# Patient Record
Sex: MALE | Race: WHITE | NOT HISPANIC OR LATINO | Employment: FULL TIME | URBAN - METROPOLITAN AREA
[De-identification: names, ages, dates, MRNs, and addresses within clinical notes are randomized per-mention and may not be internally consistent; named-entity substitution may affect disease eponyms.]

---

## 2024-09-15 ENCOUNTER — OFFICE VISIT (OUTPATIENT)
Dept: URGENT CARE | Facility: URGENT CARE | Age: 34
End: 2024-09-15
Payer: COMMERCIAL

## 2024-09-15 VITALS
RESPIRATION RATE: 16 BRPM | TEMPERATURE: 99.1 F | WEIGHT: 190 LBS | HEART RATE: 90 BPM | OXYGEN SATURATION: 100 % | DIASTOLIC BLOOD PRESSURE: 90 MMHG | SYSTOLIC BLOOD PRESSURE: 123 MMHG

## 2024-09-15 DIAGNOSIS — R05.8 PRODUCTIVE COUGH: Primary | ICD-10-CM

## 2024-09-15 DIAGNOSIS — J18.9 PNEUMONIA OF LEFT LOWER LOBE DUE TO INFECTIOUS ORGANISM: ICD-10-CM

## 2024-09-15 LAB
FLUAV AG SPEC QL IA: NEGATIVE
FLUBV AG SPEC QL IA: NEGATIVE

## 2024-09-15 PROCEDURE — 87635 SARS-COV-2 COVID-19 AMP PRB: CPT | Performed by: FAMILY MEDICINE

## 2024-09-15 PROCEDURE — 99203 OFFICE O/P NEW LOW 30 MIN: CPT | Performed by: FAMILY MEDICINE

## 2024-09-15 PROCEDURE — 87804 INFLUENZA ASSAY W/OPTIC: CPT | Performed by: FAMILY MEDICINE

## 2024-09-15 RX ORDER — CODEINE PHOSPHATE AND GUAIFENESIN 10; 100 MG/5ML; MG/5ML
1-2 SOLUTION ORAL EVERY 4 HOURS PRN
Qty: 180 ML | Refills: 0 | Status: SHIPPED | OUTPATIENT
Start: 2024-09-15

## 2024-09-15 RX ORDER — AZITHROMYCIN 250 MG/1
TABLET, FILM COATED ORAL
Qty: 6 TABLET | Refills: 0 | Status: SHIPPED | OUTPATIENT
Start: 2024-09-15 | End: 2024-09-20

## 2024-09-15 NOTE — PROGRESS NOTES
SUBJECTIVE:   Chinedu Worley is a 33 year old male presenting with a chief complaint of fever and cough - non-productive.  Onset of symptoms was 5 day(s) ago.  Course of illness is worsening.    Severity moderate  Current and Associated symptoms: fatigue  Treatment measures tried include Tylenol/Ibuprofen.  Predisposing factors include None.    No past medical history on file.  No current outpatient medications on file.     Social History     Tobacco Use    Smoking status: Never    Smokeless tobacco: Never   Substance Use Topics    Alcohol use: Not on file       ROS:  Review of systems negative except as stated above.    OBJECTIVE:  BP (!) 123/90   Pulse 90   Temp 99.1  F (37.3  C) (Tympanic)   Resp 16   Wt 86.2 kg (190 lb)   SpO2 100%   GENERAL APPEARANCE: healthy, alert and no distress  EYES: EOMI,  PERRL, conjunctiva clear  NECK: supple, nontender, no lymphadenopathy  RESP: rhonchi L lower posterior  CV: regular rates and rhythm, normal S1 S2, no murmur noted  NEURO: Normal strength and tone, sensory exam grossly normal,  normal speech and mentation  SKIN: no suspicious lesions or rashes    ASSESSMENT:  1. Productive cough    - Symptomatic COVID-19 Virus (Coronavirus) by PCR Nose  - Influenza A & B Antigen - Clinic Collect  - guaiFENesin-codeine (ROBITUSSIN AC) 100-10 MG/5ML solution; Take 5-10 mLs by mouth every 4 hours as needed.  Dispense: 180 mL; Refill: 0    2. Pneumonia of left lower lobe due to infectious organism  With persistent fever /it was 101 last night/ will treat   - azithromycin (ZITHROMAX) 250 MG tablet; Take 2 tablets (500 mg) by mouth daily for 1 day, THEN 1 tablet (250 mg) daily for 4 days.  Dispense: 6 tablet; Refill: 0      Patient Instructions   Try the mucinex   Get some claritin or zyrtec and take it 2 times per day     Please see clinical references for patient education.  Carmen Bowen M.D.

## 2024-09-16 LAB — SARS-COV-2 RNA RESP QL NAA+PROBE: NEGATIVE
